# Patient Record
Sex: MALE | Race: OTHER | NOT HISPANIC OR LATINO | URBAN - METROPOLITAN AREA
[De-identification: names, ages, dates, MRNs, and addresses within clinical notes are randomized per-mention and may not be internally consistent; named-entity substitution may affect disease eponyms.]

---

## 2020-08-10 ENCOUNTER — EMERGENCY (EMERGENCY)
Facility: HOSPITAL | Age: 25
LOS: 1 days | Discharge: HOME | End: 2020-08-10
Attending: EMERGENCY MEDICINE
Payer: MEDICAID

## 2020-08-10 VITALS
RESPIRATION RATE: 18 BRPM | HEIGHT: 66 IN | DIASTOLIC BLOOD PRESSURE: 94 MMHG | WEIGHT: 145.95 LBS | HEART RATE: 94 BPM | TEMPERATURE: 98 F | OXYGEN SATURATION: 99 % | SYSTOLIC BLOOD PRESSURE: 134 MMHG

## 2020-08-10 VITALS
OXYGEN SATURATION: 98 % | DIASTOLIC BLOOD PRESSURE: 72 MMHG | SYSTOLIC BLOOD PRESSURE: 108 MMHG | HEART RATE: 65 BPM | TEMPERATURE: 98 F | RESPIRATION RATE: 18 BRPM

## 2020-08-10 LAB
ALBUMIN SERPL ELPH-MCNC: 5.3 G/DL — HIGH (ref 3.3–5)
ALP SERPL-CCNC: 109 U/L — SIGNIFICANT CHANGE UP (ref 40–120)
ALT FLD-CCNC: 22 U/L — SIGNIFICANT CHANGE UP (ref 10–45)
ANION GAP SERPL CALC-SCNC: 15 MMOL/L — SIGNIFICANT CHANGE UP (ref 5–17)
APPEARANCE UR: ABNORMAL
AST SERPL-CCNC: 19 U/L — SIGNIFICANT CHANGE UP (ref 10–40)
BACTERIA # UR AUTO: NEGATIVE — SIGNIFICANT CHANGE UP
BASOPHILS # BLD AUTO: 0.02 K/UL — SIGNIFICANT CHANGE UP (ref 0–0.2)
BASOPHILS NFR BLD AUTO: 0.1 % — SIGNIFICANT CHANGE UP (ref 0–2)
BILIRUB SERPL-MCNC: 0.5 MG/DL — SIGNIFICANT CHANGE UP (ref 0.2–1.2)
BILIRUB UR-MCNC: NEGATIVE — SIGNIFICANT CHANGE UP
BUN SERPL-MCNC: 21 MG/DL — SIGNIFICANT CHANGE UP (ref 7–23)
CALCIUM SERPL-MCNC: 10.7 MG/DL — HIGH (ref 8.4–10.5)
CHLORIDE SERPL-SCNC: 98 MMOL/L — SIGNIFICANT CHANGE UP (ref 96–108)
CO2 SERPL-SCNC: 26 MMOL/L — SIGNIFICANT CHANGE UP (ref 22–31)
COLOR SPEC: YELLOW — SIGNIFICANT CHANGE UP
CREAT SERPL-MCNC: 1.13 MG/DL — SIGNIFICANT CHANGE UP (ref 0.5–1.3)
DIFF PNL FLD: NEGATIVE — SIGNIFICANT CHANGE UP
EOSINOPHIL # BLD AUTO: 0.01 K/UL — SIGNIFICANT CHANGE UP (ref 0–0.5)
EOSINOPHIL NFR BLD AUTO: 0.1 % — SIGNIFICANT CHANGE UP (ref 0–6)
EPI CELLS # UR: 15 — SIGNIFICANT CHANGE UP
GLUCOSE SERPL-MCNC: 115 MG/DL — HIGH (ref 70–99)
GLUCOSE UR QL: NEGATIVE — SIGNIFICANT CHANGE UP
HCT VFR BLD CALC: 42.8 % — SIGNIFICANT CHANGE UP (ref 39–50)
HGB BLD-MCNC: 14.6 G/DL — SIGNIFICANT CHANGE UP (ref 13–17)
HYALINE CASTS # UR AUTO: 43 /LPF — HIGH (ref 0–7)
IMM GRANULOCYTES NFR BLD AUTO: 0.4 % — SIGNIFICANT CHANGE UP (ref 0–1.5)
KETONES UR-MCNC: SIGNIFICANT CHANGE UP
LEUKOCYTE ESTERASE UR-ACNC: ABNORMAL
LYMPHOCYTES # BLD AUTO: 1.3 K/UL — SIGNIFICANT CHANGE UP (ref 1–3.3)
LYMPHOCYTES # BLD AUTO: 8.5 % — LOW (ref 13–44)
MAGNESIUM SERPL-MCNC: 2.1 MG/DL — SIGNIFICANT CHANGE UP (ref 1.6–2.6)
MCHC RBC-ENTMCNC: 30.3 PG — SIGNIFICANT CHANGE UP (ref 27–34)
MCHC RBC-ENTMCNC: 34.1 GM/DL — SIGNIFICANT CHANGE UP (ref 32–36)
MCV RBC AUTO: 88.8 FL — SIGNIFICANT CHANGE UP (ref 80–100)
MONOCYTES # BLD AUTO: 0.41 K/UL — SIGNIFICANT CHANGE UP (ref 0–0.9)
MONOCYTES NFR BLD AUTO: 2.7 % — SIGNIFICANT CHANGE UP (ref 2–14)
NEUTROPHILS # BLD AUTO: 13.58 K/UL — HIGH (ref 1.8–7.4)
NEUTROPHILS NFR BLD AUTO: 88.2 % — HIGH (ref 43–77)
NITRITE UR-MCNC: NEGATIVE — SIGNIFICANT CHANGE UP
NRBC # BLD: 0 /100 WBCS — SIGNIFICANT CHANGE UP (ref 0–0)
PH UR: 6 — SIGNIFICANT CHANGE UP (ref 5–8)
PHOSPHATE SERPL-MCNC: 3.4 MG/DL — SIGNIFICANT CHANGE UP (ref 2.5–4.5)
PLATELET # BLD AUTO: 260 K/UL — SIGNIFICANT CHANGE UP (ref 150–400)
POTASSIUM SERPL-MCNC: 4.1 MMOL/L — SIGNIFICANT CHANGE UP (ref 3.5–5.3)
POTASSIUM SERPL-SCNC: 4.1 MMOL/L — SIGNIFICANT CHANGE UP (ref 3.5–5.3)
PROT SERPL-MCNC: 8.7 G/DL — HIGH (ref 6–8.3)
PROT UR-MCNC: 100 — SIGNIFICANT CHANGE UP
RBC # BLD: 4.82 M/UL — SIGNIFICANT CHANGE UP (ref 4.2–5.8)
RBC # FLD: 12.1 % — SIGNIFICANT CHANGE UP (ref 10.3–14.5)
RBC CASTS # UR COMP ASSIST: 0 /HPF — SIGNIFICANT CHANGE UP (ref 0–4)
SODIUM SERPL-SCNC: 139 MMOL/L — SIGNIFICANT CHANGE UP (ref 135–145)
SP GR SPEC: 1.03 — HIGH (ref 1.01–1.02)
UROBILINOGEN FLD QL: ABNORMAL
WBC # BLD: 15.38 K/UL — HIGH (ref 3.8–10.5)
WBC # FLD AUTO: 15.38 K/UL — HIGH (ref 3.8–10.5)
WBC UR QL: 14 /HPF — HIGH (ref 0–5)

## 2020-08-10 PROCEDURE — 80053 COMPREHEN METABOLIC PANEL: CPT

## 2020-08-10 PROCEDURE — 96374 THER/PROPH/DIAG INJ IV PUSH: CPT

## 2020-08-10 PROCEDURE — 99284 EMERGENCY DEPT VISIT MOD MDM: CPT

## 2020-08-10 PROCEDURE — 99284 EMERGENCY DEPT VISIT MOD MDM: CPT | Mod: 25

## 2020-08-10 PROCEDURE — 84100 ASSAY OF PHOSPHORUS: CPT

## 2020-08-10 PROCEDURE — 87086 URINE CULTURE/COLONY COUNT: CPT

## 2020-08-10 PROCEDURE — 83735 ASSAY OF MAGNESIUM: CPT

## 2020-08-10 PROCEDURE — 81001 URINALYSIS AUTO W/SCOPE: CPT

## 2020-08-10 PROCEDURE — 85027 COMPLETE CBC AUTOMATED: CPT

## 2020-08-10 RX ORDER — CEFPODOXIME PROXETIL 100 MG
1 TABLET ORAL
Qty: 20 | Refills: 0
Start: 2020-08-10 | End: 2020-08-19

## 2020-08-10 RX ORDER — CEFTRIAXONE 500 MG/1
1000 INJECTION, POWDER, FOR SOLUTION INTRAMUSCULAR; INTRAVENOUS ONCE
Refills: 0 | Status: COMPLETED | OUTPATIENT
Start: 2020-08-10 | End: 2020-08-10

## 2020-08-10 RX ORDER — IBUPROFEN 200 MG
600 TABLET ORAL ONCE
Refills: 0 | Status: COMPLETED | OUTPATIENT
Start: 2020-08-10 | End: 2020-08-10

## 2020-08-10 RX ORDER — ACETAMINOPHEN 500 MG
975 TABLET ORAL ONCE
Refills: 0 | Status: COMPLETED | OUTPATIENT
Start: 2020-08-10 | End: 2020-08-10

## 2020-08-10 RX ADMIN — Medication 975 MILLIGRAM(S): at 16:40

## 2020-08-10 RX ADMIN — CEFTRIAXONE 100 MILLIGRAM(S): 500 INJECTION, POWDER, FOR SOLUTION INTRAMUSCULAR; INTRAVENOUS at 18:39

## 2020-08-10 RX ADMIN — Medication 600 MILLIGRAM(S): at 16:40

## 2020-08-10 RX ADMIN — Medication 600 MILLIGRAM(S): at 19:02

## 2020-08-10 RX ADMIN — Medication 975 MILLIGRAM(S): at 19:01

## 2020-08-10 NOTE — ED PROVIDER NOTE - NSFOLLOWUPINSTRUCTIONS_ED_ALL_ED_FT
You were evaluated in the emergency department for lower back pain, cramps and vomiting. Your bloodwork and urine came back negative. The results are attached.    Your lower back pain is likely a lumbar strain due to heavy lifting. You can alternate Tylenol and Motrin for the pain as indicated on the instructions on the bottle.     SEEK IMMEDIATE MEDICAL CARE IF YOU HAVE ANY OF THE FOLLOWING SYMPTOMS: bowel or bladder control problems, unusual weakness or numbness in your arms or legs, nausea or vomiting, abdominal pain, fever, dizziness/lightheadedness. You were evaluated in the emergency department for lower back pain, cramps and vomiting. Your bloodwork and urine show evidence of an infection. The results are attached.    You were given one dose of an antibiotic in the ED. You should continue taking the oral antibiotics prescribed to you and follow up with a urologist.     Your lower back pain is likely a lumbar strain due to heavy lifting. You can alternate Tylenol and Motrin for the pain as indicated on the instructions on the bottle.     SEEK IMMEDIATE MEDICAL CARE IF YOU HAVE ANY OF THE FOLLOWING SYMPTOMS: bowel or bladder control problems, unusual weakness or numbness in your arms or legs, nausea or vomiting, abdominal pain, fever, dizziness/lightheadedness. You were evaluated in the emergency department for lower back pain, cramps and vomiting. Your bloodwork and urine show evidence of an infection. The results are attached.    You were given one dose of an antibiotic in the ED. You should continue taking the oral antibiotics prescribed to you and follow up with a urologist.     Your lower back pain is likely a lumbar strain due to heavy lifting. You can alternate Tylenol and Motrin for the pain as indicated on the instructions on the bottle.     SEEK IMMEDIATE MEDICAL CARE IF YOU HAVE ANY OF THE FOLLOWING SYMPTOMS: bowel or bladder control problems, unusual weakness or numbness in your arms or legs, nausea or vomiting, abdominal pain, fever, dizziness/lightheadedness, blood in urine. You were evaluated in the emergency department for lower back pain, cramps and vomiting. Your bloodwork and urine show evidence of an infection. The results are attached.    You were given one dose of an antibiotic in the ED. You are prescribed [facility]  Cefpodoxime. Take as directed by your pharmacist.     Your lower back pain is likely a lumbar strain due to heavy lifting. You can alternate Tylenol and Motrin for the pain as indicated on the instructions on the bottle.     SEEK IMMEDIATE MEDICAL CARE IF YOU HAVE ANY OF THE FOLLOWING SYMPTOMS: bowel or bladder control problems, unusual weakness or numbness in your arms or legs, nausea or vomiting, abdominal pain, fever, dizziness/lightheadedness, blood in urine.

## 2020-08-10 NOTE — ED ADULT NURSE NOTE - CHIEF COMPLAINT QUOTE
back pain muscle cramps vomiting  Denies burn or diff urinating C/o SOB Resp even and nonlab  # [id number]

## 2020-08-10 NOTE — ED ADULT NURSE REASSESSMENT NOTE - NS ED NURSE REASSESS COMMENT FT1
Resident at bedside on phone with .  Pt updated on POC.  Pt pain has decreased.  Pt is alert and oriented x 3.  Safety maintained.

## 2020-08-10 NOTE — ED PROVIDER NOTE - PROGRESS NOTE DETAILS
Mabel George PGY-1: Labs show leukocytosis, UA consistent with urinary tract infection. Will dose ceftriaxone here, send home with oral abx and urology follow up. Mabel George PGY-1: Explained diagnosis to patient who understands diagnosis and is in agreement with plan for oral antibiotics and follow up with urologist. Pt signed out to me by silver, pending labs, urine. labs do reveal leukocytosis. Per signout pt without red flag sx of back pain. Pt confirms via  no IVDU x 4 mo, no incontinence, no saddle anesthesia. No midline TTP on exam. Extremely low suspicion acute spine pathology. UA suggestive of UTI, pt has had this before. results, d/c instructions, return precautions were d/w pt utilizing  (dr gomes). - Omid Triana MD

## 2020-08-10 NOTE — ED PROVIDER NOTE - CLINICAL SUMMARY MEDICAL DECISION MAKING FREE TEXT BOX
25 year old male with no PMH presenting with back pain, body cramps, vomiting. Non toxic appearing, hemodynamically stable. Exam only notable for lumbar paraspinal tenderness bilaterally. No spinal point tenderness or stepoffs, no CVA tenderness. Back pain unlikely renal colic, most likely lumbar strain given patient's job. Other symptoms non specific, possibly viral gastroenteritis. Will send U/A, labs (CBC, CMP, mag and phos). Pain control. Re-evaluate.

## 2020-08-10 NOTE — ED PROVIDER NOTE - OBJECTIVE STATEMENT
25 year old Syriac speaking male presenting for evaluation of back pain, cramping and vomiting x1 week. Patient notes 1 week ago began experiencing lower back pain, followed intermittent cramping all over body. Had one episode of non bilious non bloody vomiting  History obtained with  #[id number]. 25 year old Kazakh speaking male presenting for evaluation of back pain, cramping and vomiting x1 week. Patient notes 1 week ago began experiencing lower back pain, followed intermittent cramping all over body. Had one episode of non bilious non bloody vomiting at work earlier today. 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 Patient notes dark urine and some pain with urination. Denies hematuria, fevers, chills, diarrhea, abdominal pain, CP, SOB.  History obtained with  #[id number]. 25 year old Chinese speaking male presenting for evaluation of back pain, cramping and vomiting x1 week. Patient notes 1 week ago began experiencing lower back pain, followed intermittent cramping all over body. Had one episode of non bilious non bloody vomiting at work earlier today. 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 Patient notes dark urine and some pain with urination. States he had a UTI 3 years ago but did not see a urologist. Former IV drug user and quit 4 months ago. Denies bowel or bladder incontinence, saddle anesthesia, lower extremity paresthesias or weakness. Denies hematuria, fevers, chills, diarrhea, abdominal pain, CP, SOB.  History obtained with  #[id number].

## 2020-08-10 NOTE — ED PROVIDER NOTE - NS ED ROS FT
CONST: no fevers, no chills  EYES: no pain, no vision changes  ENT: no sore throat, no ear pain, no change in hearing  CV: no chest pain, no leg swelling  RESP: no shortness of breath, no cough  ABD: no abdominal pain, no nausea, no vomiting, no diarrhea  : dysuria, no flank pain, no hematuria  MSK: lower back pain, no extremity pain  NEURO: no headache or additional neurologic complaints  HEME: no easy bleeding  SKIN:  no rash

## 2020-08-10 NOTE — ED ADULT NURSE NOTE - OBJECTIVE STATEMENT
Pt on stretcher, alert and oriented x 3.  Pt c/o back pain while working. Pt denies chest pain or SOB.  Pt respirations even and unlabored.  Abdomen soft, non tender.  Skin warm, dry, and appropriate color for age and race.  Pt denies any other complaints at this time.

## 2020-08-10 NOTE — ED ADULT TRIAGE NOTE - CHIEF COMPLAINT QUOTE
back pain muscle cramps vomiting  Denies burn or diff urinating C/o SOB Resp even and nonlab  # [id number]

## 2020-08-10 NOTE — ED PROVIDER NOTE - PHYSICAL EXAMINATION
GENERAL: Awake, alert, NAD  HEENT: NC/AT, moist mucous membranes, PERRL, EOMI  LUNGS: CTAB, no wheezes or crackles   CARDIAC: RRR, no m/r/g  ABDOMEN: Soft, normal BS, non tender, non distended, no rebound, no guarding  BACK: Bilateral lumbar paraspinal tenderness, no stepoffs. No midline spinal tenderness, no CVA tenderness  EXT: No edema, no calf tenderness, 2+ DP pulses bilaterally, no deformities.  NEURO: A&Ox3. Moving all extremities.  SKIN: Warm and dry. No rash.  PSYCH: Normal affect.

## 2020-08-10 NOTE — ED ADULT NURSE REASSESSMENT NOTE - NS ED NURSE REASSESS COMMENT FT1
report received from annita tobias. patient resting on stretcher. IV abx finished at this time. patient is aaox3, lungs CTA bilaterally, abd, soft, nondistended, nontender, cap refill <3, radial pulses +2 bilaterally, abd soft, nondistended, nontender, cap refill <3, radial pulses +2 bilaterally. patient denies chest pain, shortness of breath, ha, dizziness, weakness, numbness or tingling, fever, chills, cough, abd pain, back pain, n/v/d, changes in bowel or bladder, hematuria, bloody stool. patient comfort and safety provided. MD at bedside to assess.

## 2020-08-10 NOTE — ED PROVIDER NOTE - ATTENDING CONTRIBUTION TO CARE
Patient presenting complaining of bilateral lower back pains over past 2-3 days associated with generalized aches/malaise, headache.  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  Former IVDU but reporting being clean x4 weeks.  No noted sick contacts at home or school.  Does endorse some dyscomfort with urination and change in quality of urine.    Exam:  General: Patient well appearing, vital signs within normal limits  HEENT: airway patent with moist mucous membranes  Cardiac: RRR S1/S2 with strong peripheral pulses  Respiratory: lungs clear without respiratory distress  GI: abdomen soft, non tender, non distended  Neuro: no gross neurologic deficits  Skin: warm, well perfused  Psych: normal mood and affect    Patient presenting with lower back tenderness, no midline tenderness, along with urinary symptoms - labs, UA, pain control, re-evaluate.

## 2020-08-10 NOTE — ED PROVIDER NOTE - NSFOLLOWUPCLINICS_GEN_ALL_ED_FT
[facility] General Internal Medicine  General Internal Medicine  [address] [id number]  Phone: [phone]  Fax:   Follow Up Time: 4-6 Days [facility] General Internal Medicine  General Internal Medicine  [address] [place] [address]  Phone: [phone]  Fax:   Follow Up Time: 4-6 Days    [facility]  Urology  [address] [place] [address] [place] [address]  Phone: [phone]  Fax:   Follow Up Time: 4-6 Days

## 2020-08-10 NOTE — ED PROVIDER NOTE - PATIENT PORTAL LINK FT
You can access the FollowMyHealth Patient Portal offered by [facility] by registering at the following website: [email or web address]. By joining [employer]’s FollowMyHealth portal, you will also be able to view your health information using other applications (apps) compatible with our system.

## 2020-08-11 LAB
CULTURE RESULTS: SIGNIFICANT CHANGE UP
SPECIMEN SOURCE: SIGNIFICANT CHANGE UP
